# Patient Record
Sex: MALE | Race: WHITE | NOT HISPANIC OR LATINO | Employment: STUDENT | URBAN - METROPOLITAN AREA
[De-identification: names, ages, dates, MRNs, and addresses within clinical notes are randomized per-mention and may not be internally consistent; named-entity substitution may affect disease eponyms.]

---

## 2017-01-06 ENCOUNTER — HOSPITAL ENCOUNTER (OUTPATIENT)
Dept: RADIOLOGY | Facility: MEDICAL CENTER | Age: 27
End: 2017-01-06
Attending: STUDENT IN AN ORGANIZED HEALTH CARE EDUCATION/TRAINING PROGRAM
Payer: MEDICAID

## 2017-01-06 ENCOUNTER — OFFICE VISIT (OUTPATIENT)
Dept: INTERNAL MEDICINE | Facility: MEDICAL CENTER | Age: 27
End: 2017-01-06
Payer: MEDICAID

## 2017-01-06 VITALS
RESPIRATION RATE: 18 BRPM | DIASTOLIC BLOOD PRESSURE: 64 MMHG | WEIGHT: 178.6 LBS | HEIGHT: 70 IN | HEART RATE: 78 BPM | BODY MASS INDEX: 25.57 KG/M2 | TEMPERATURE: 98.8 F | OXYGEN SATURATION: 97 % | SYSTOLIC BLOOD PRESSURE: 118 MMHG

## 2017-01-06 DIAGNOSIS — M79.644 FINGER PAIN, RIGHT: ICD-10-CM

## 2017-01-06 PROCEDURE — 73140 X-RAY EXAM OF FINGER(S): CPT | Mod: RT

## 2017-01-06 PROCEDURE — 99213 OFFICE O/P EST LOW 20 MIN: CPT | Mod: GE | Performed by: INTERNAL MEDICINE

## 2017-01-06 ASSESSMENT — PATIENT HEALTH QUESTIONNAIRE - PHQ9: CLINICAL INTERPRETATION OF PHQ2 SCORE: 0

## 2017-01-06 NOTE — PROGRESS NOTES
"        Established Patient    Chico Adame is a 26 y.o. male who presents today with the following:    CC: Right 5th digit pain    HPI: 27 yo male with no significant PMH presents here with right 5th digit pain x 6 weeks. 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 He is unsure about the exact mechanism of injury but feels that it might of occurred via forced flexion while he was falling on the floor. He states that the pain is minimal and only present during movement or touch. He has been self-treating with ice and mechanical splint which have marginally helped. He came to the clinic because he figured he needed an xray since it's been 6 weeks and the finger has failed to heal completely, and his finger (pinky) DIP joint is in the continually flexed position when not in the splint. Patient denied fevers, nausea, vomiting, headaches, or any other extremity injury.        ROS: As per HPI. Additional pertinent symptoms as noted below.  Negative except mentioned above in HPI        Patient Active Problem List    Diagnosis Date Noted   • Finger pain, right 01/06/2017       Social History   Substance Use Topics   • Smoking status: Never Smoker    • Smokeless tobacco: Never Used   • Alcohol Use: 2.4 oz/week     2 Cans of beer, 2 Shots of liquor per week      Comment: occasa       Current Outpatient Prescriptions   Medication Sig Dispense Refill   • hydrocodone-acetaminophen 2.5-108 mg/5mL (HYCET) 7.5-325 MG/15ML solution Take 15.4 mL by mouth 4 times a day as needed. 1000 mL 0   • ondansetron (ZOFRAN ODT) 8 MG TABLET DISPERSIBLE Take 1 Tab by mouth every 8 hours as needed for Nausea/Vomiting. 15 Tab 0   • fluticasone (FLONASE) 50 MCG/ACT nasal spray Spray 1 Spray in nose every day. 16 g 11     No current facility-administered medications for this visit.       /64 mmHg  Pulse 78  Temp(Src) 37.1 °C (98.8 °F)  Resp 18  Ht 1.778 m (5' 10\")  Wt 81.012 " kg (178 lb 9.6 oz)  BMI 25.63 kg/m2  SpO2 97%    Physical Exam  General: Well developed, well nourished male, in no distress.  Eyes: Conjuntiva without any obvious injection or erythema.   Ears- canals and TMs clear  Mouth- mucous membranes moist, no erythema  Cardiovascular: Heart is regular with no murmurs, no bruits  Lungs: Clear to auscultation bilaterally. No wheezes, rhonci or crackles heard. Respiratory effort is normal.  Abd: Soft, non-tender  Ext: No  LE edema, Right 5th digit swelling appreciated, slight tenderness upon finger flexion/extension      Assessment and Plan      1. Finger pain, right  - DX-FINGER(S) 2+ RIGHT; Future  - Recommended pain control with aleve/ibuprofen  - Also recommended that patient continue to use splint, especially at night  - Patient will RTC in 1 week to discuss results of xray  - If abnormal findings found on xray, will recommend referral to ortho for additional management      Return in about 1 week (around 1/13/2017).      Signed by: Checo Lynn M.D.    This note was created using voice recognition software. There may be unintended errors in spelling, grammar or content.

## 2017-01-06 NOTE — MR AVS SNAPSHOT
"Chico Adame   2017 9:15 AM   Office Visit   MRN: [id number]    Department:  [facility] Med - Internal Med   Dept Phone:  [phone]    Description:  Male : 1990   Provider:  Checo Lynn M.D.           Reason for Visit     Back Pain times one month 1/2    Pain trigger finger      Allergies as of 2017     No Known Allergies      You were diagnosed with     Finger pain, right   [[id number]]         Vital Signs     Blood Pressure Pulse Temperature Respirations Height Weight    118/64 mmHg 78 37.1 °C (98.8 °F) 18 1.778 m (5' 10\") 81.012 kg (178 lb 9.6 oz)    Body Mass Index Oxygen Saturation Smoking Status             25.63 kg/m2 97% Never Smoker          Basic Information     Date Of Birth Sex Race Ethnicity Preferred Language    1990 Male White Non- English      Your appointments     2017  8:15 AM   Established Patient with Leticia James M.D.   [facility] Med - Internal Medicine (--)    [address] [place] [id number] [phone]           You will be receiving a confirmation call a few days before your appointment from our automated call confirmation system.              Health Maintenance        Date Due Completion Dates    IMM HEP B VACCINE (1 of 3 - Primary Series) 1991 ---    IMM HEP A VACCINE (1 of 2 - Standard Series) 1992 ---    IMM HPV VACCINE (1 of 3 - Male 3 Dose Series) 2002 ---    IMM VARICELLA (CHICKENPOX) VACCINE (1 of 2 - 2 Dose Adolescent Series) 2004 ---    IMM INFLUENZA (1) 2016 ---    IMM DTaP/Tdap/Td Vaccine (2 - Td) 2021            Current Immunizations     Tdap Vaccine 2011      Below and/or attached are the medications your provider expects you to take. Review all of your home medications and newly ordered medications with your provider and/or pharmacist. Follow medication instructions as directed by your provider and/or pharmacist. Please keep your medication list with " you and share with your provider. Update the information when medications are discontinued, doses are changed, or new medications (including over-the-counter products) are added; and carry medication information at all times in the event of emergency situations     Allergies:  No Known Allergies          Medications  Valid as of: January 06, 2017 -  9:38 AM    Generic Name Brand Name Tablet Size Instructions for use    Fluticasone Propionate (Suspension) FLONASE 50 MCG/ACT Spray 1 Spray in nose every day.        Hydrocodone-Acetaminophen (Solution) HYCET 7.5-325 MG/15ML Take 15.4 mL by mouth 4 times a day as needed.        Ondansetron (TABLET DISPERSIBLE) ZOFRAN ODT 8 MG Take 1 Tab by mouth every 8 hours as needed for Nausea/Vomiting.        .                 Medicines prescribed today were sent to:     [facility][id number] - [place], [place] - [address] [address]    Phone: [phone] Fax: [phone]    Open 24 Hours?: No      Medication refill instructions:       If your prescription bottle indicates you have medication refills left, it is not necessary to call your provider’s office. Please contact your pharmacy and they will refill your medication.    If your prescription bottle indicates you do not have any refills left, you may request refills at any time through one of the following ways: The online [employer] system (except Urgent Care), by calling your provider’s office, or by asking your pharmacy to contact your provider’s office with a refill request. Medication refills are processed only during regular business hours and may not be available until the next business day. Your provider may request additional information or to have a follow-up visit with you prior to refilling your medication.   *Please Note: Medication refills are assigned a new Rx number when refilled electronically. Your pharmacy may indicate that no refills were authorized even though a new prescription for the same  medication is available at the pharmacy. Please request the medicine by name with the pharmacy before contacting your provider for a refill.        Your To Do List     Future Labs/Procedures Complete By Expires    DX-FINGER(S) 2+ RIGHT  As directed 1/6/2018      Instructions    Finger Fracture  Fractures of fingers are breaks in the bones of the fingers. There are many types of fractures. There are different ways of treating these fractures. Your health care provider will discuss the best way to treat your fracture.  CAUSES  Traumatic injury is the main cause of broken fingers. These include:  · Injuries while playing sports.  · Workplace injuries.  · Falls.  RISK FACTORS  Activities that can increase your risk of finger fractures include:  · Sports.  · Workplace activities that involve machinery.  · A condition called osteoporosis, which can make your bones less dense and cause them to fracture more easily.  SIGNS AND SYMPTOMS  The main symptoms of a broken finger are pain and swelling within 15 minutes after the injury. Other symptoms include:  · Bruising of your finger.  · Stiffness of your finger.  · Numbness of your finger.  · Exposed bones (compound fracture) if the fracture is severe.  DIAGNOSIS   The best way to diagnose a broken bone is with X-ray imaging. Additionally, your health care provider will use this X-ray image to evaluate the position of the broken finger bones.   TREATMENT   Finger fractures can be treated with:   · Nonreduction--This means the bones are in place. The finger is splinted without changing the positions of the bone pieces. The splint is usually left on for about a week to 10 days. This will depend on your fracture and what your health care provider thinks.  · Closed reduction--The bones are put back into position without using surgery. The finger is then splinted.  · Open reduction and internal fixation--The fracture site is opened. Then the bone pieces are fixed into place with  pins or some type of hardware. This is seldom required. It depends on the severity of the fracture.  HOME CARE INSTRUCTIONS   · Follow your health care provider's instructions regarding activities, exercises, and physical therapy.  · Only take over-the-counter or prescription medicines for pain, discomfort, or fever as directed by your health care provider.  SEEK MEDICAL CARE IF:  You have pain or swelling that limits the motion or use of your fingers.  SEEK IMMEDIATE MEDICAL CARE IF:   Your finger becomes numb.  MAKE SURE YOU:   · Understand these instructions.  · Will watch your condition.  · Will get help right away if you are not doing well or get worse.     This information is not intended to replace advice given to you by your health care provider. Make sure you discuss any questions you have with your health care provider.     Document Released: 04/01/2002 Document Revised: 10/08/2014 Document Reviewed: 07/30/2014  [employer] Interactive Patient Education ©2016 [employer]    Finger Sprain  A finger sprain is a tear in one of the strong, fibrous tissues that connect the bones (ligaments) in your finger. The severity of the sprain depends on how much of the ligament is torn. The tear can be either partial or complete.  CAUSES   Often, sprains are a result of a fall or accident. If you extend your hands to catch an object or to protect yourself, the force of the impact causes the fibers of your ligament to stretch too much. This excess tension causes the fibers of your ligament to tear.  SYMPTOMS   You may have some loss of motion in your finger. Other symptoms include:  · Bruising.  · Tenderness.  · Swelling.  DIAGNOSIS   In order to diagnose finger sprain, your caregiver will physically examine your finger or thumb to determine how torn the ligament is. Your caregiver may also suggest an X-ray exam of your finger to make sure no bones are broken.  TREATMENT   If your ligament is only partially torn, treatment  usually involves keeping the finger in a fixed position (immobilization) for a short period. To do this, your caregiver will apply a bandage, cast, or splint to keep your finger from moving until it heals. For a partially torn ligament, the healing process usually takes 2 to 3 weeks.  If your ligament is completely torn, you may need surgery to reconnect the ligament to the bone. After surgery a cast or splint will be applied and will need to stay on your finger or thumb for 4 to 6 weeks while your ligament heals.  HOME CARE INSTRUCTIONS  · Keep your injured finger elevated, when possible, to decrease swelling.  · To ease pain and swelling, apply ice to your joint twice a day, for 2 to 3 days:  ¨ Put ice in a plastic bag.  ¨ Place a towel between your skin and the bag.  ¨ Leave the ice on for 15 minutes.  · Only take over-the-counter or prescription medicine for pain as directed by your caregiver.  · Do not wear rings on your injured finger.  · Do not leave your finger unprotected until pain and stiffness go away (usually 3 to 4 weeks).  · Do not allow your cast or splint to get wet. Cover your cast or splint with a plastic bag when you shower or bathe. Do not swim.  · Your caregiver may suggest special exercises for you to do during your recovery to prevent or limit permanent stiffness.  SEEK IMMEDIATE MEDICAL CARE IF:  · Your cast or splint becomes damaged.  · Your pain becomes worse rather than better.  MAKE SURE YOU:  · Understand these instructions.  · Will watch your condition.  · Will get help right away if you are not doing well or get worse.     This information is not intended to replace advice given to you by your health care provider. Make sure you discuss any questions you have with your health care provider.     Document Released: 01/25/2006 Document Revised: 01/08/2016 Document Reviewed: 08/20/2012  [employer] Interactive Patient Education ©2016 [employer]            MyChart Status: Patient  Declined

## 2017-01-06 NOTE — PATIENT INSTRUCTIONS
Finger Fracture  Fractures of fingers are breaks in the bones of the fingers. There are many types of fractures. There are different ways of treating these fractures. Your health care provider will discuss the best way to treat your fracture.  CAUSES  Traumatic injury is the main cause of broken fingers. These include:  · Injuries while playing sports.  · Workplace injuries.  · Falls.  RISK FACTORS  Activities that can increase your risk of finger fractures include:  · Sports.  · Workplace activities that involve machinery.  · A condition called osteoporosis, which can make your bones less dense and cause them to fracture more easily.  SIGNS AND SYMPTOMS  The main symptoms of a broken finger are pain and swelling within 15 minutes after the injury. Other symptoms include:  · Bruising of your finger.  · Stiffness of your finger.  · Numbness of your finger.  · Exposed bones (compound fracture) if the fracture is severe.  DIAGNOSIS   The best way to diagnose a broken bone is with X-ray imaging. Additionally, your health care provider will use this X-ray image to evaluate the position of the broken finger bones.   TREATMENT   Finger fractures can be treated with:   · Nonreduction--This means the bones are in place. The finger is splinted without changing the positions of the bone pieces. The splint is usually left on for about a week to 10 days. This will depend on your fracture and what your health care provider thinks.  · Closed reduction--The bones are put back into position without using surgery. The finger is then splinted.  · Open reduction and internal fixation--The fracture site is opened. Then the bone pieces are fixed into place with pins or some type of hardware. This is seldom required. It depends on the severity of the fracture.  HOME CARE INSTRUCTIONS   · Follow your health care provider's instructions regarding activities, exercises, and physical therapy.  · Only take over-the-counter or prescription  medicines for pain, discomfort, or fever as directed by your health care provider.  SEEK MEDICAL CARE IF:  You have pain or swelling that limits the motion or use of your fingers.  SEEK IMMEDIATE MEDICAL CARE IF:   Your finger becomes numb.  MAKE SURE YOU:   · Understand these instructions.  · Will watch your condition.  · Will get help right away if you are not doing well or get worse.     This information is not intended to replace advice given to you by your health care provider. Make sure you discuss any questions you have with your health care provider.     Document Released: 04/01/2002 Document Revised: 10/08/2014 Document Reviewed: 07/30/2014  [employer] Interactive Patient Education ©2016 [employer]    Finger Sprain  A finger sprain is a tear in one of the strong, fibrous tissues that connect the bones (ligaments) in your finger. The severity of the sprain depends on how much of the ligament is torn. The tear can be either partial or complete.  CAUSES   Often, sprains are a result of a fall or accident. If you extend your hands to catch an object or to protect yourself, the force of the impact causes the fibers of your ligament to stretch too much. This excess tension causes the fibers of your ligament to tear.  SYMPTOMS   You may have some loss of motion in your finger. Other symptoms include:  · Bruising.  · Tenderness.  · Swelling.  DIAGNOSIS   In order to diagnose finger sprain, your caregiver will physically examine your finger or thumb to determine how torn the ligament is. Your caregiver may also suggest an X-ray exam of your finger to make sure no bones are broken.  TREATMENT   If your ligament is only partially torn, treatment usually involves keeping the finger in a fixed position (immobilization) for a short period. To do this, your caregiver will apply a bandage, cast, or splint to keep your finger from moving until it heals. For a partially torn ligament, the healing process usually takes 2 to  3 weeks.  If your ligament is completely torn, you may need surgery to reconnect the ligament to the bone. After surgery a cast or splint will be applied and will need to stay on your finger or thumb for 4 to 6 weeks while your ligament heals.  HOME CARE INSTRUCTIONS  · Keep your injured finger elevated, when possible, to decrease swelling.  · To ease pain and swelling, apply ice to your joint twice a day, for 2 to 3 days:  ¨ Put ice in a plastic bag.  ¨ Place a towel between your skin and the bag.  ¨ Leave the ice on for 15 minutes.  · Only take over-the-counter or prescription medicine for pain as directed by your caregiver.  · Do not wear rings on your injured finger.  · Do not leave your finger unprotected until pain and stiffness go away (usually 3 to 4 weeks).  · Do not allow your cast or splint to get wet. Cover your cast or splint with a plastic bag when you shower or bathe. Do not swim.  · Your caregiver may suggest special exercises for you to do during your recovery to prevent or limit permanent stiffness.  SEEK IMMEDIATE MEDICAL CARE IF:  · Your cast or splint becomes damaged.  · Your pain becomes worse rather than better.  MAKE SURE YOU:  · Understand these instructions.  · Will watch your condition.  · Will get help right away if you are not doing well or get worse.     This information is not intended to replace advice given to you by your health care provider. Make sure you discuss any questions you have with your health care provider.     Document Released: 01/25/2006 Document Revised: 01/08/2016 Document Reviewed: 08/20/2012  [employer] Interactive Patient Education ©2016 [employer]